# Patient Record
(demographics unavailable — no encounter records)

---

## 2025-05-23 NOTE — HISTORY OF PRESENT ILLNESS
[FreeTextEntry1] : Ms. Wood is a 78 year old female who is referred to our clinic today by Dr. Horn for evaluation of her Aortic Stenosis. She has a past medical history of HTN, Type 2 Diabetes, Diastolic dysfunction and remote Cervical Cancer. She was never a smoker.  Today she presents  She had a Transthoracic Echocardiogram which was evaluated with Dr. Nannette An, and demonstrates     NYHA Classification: STS 30-day Mortality Risk Score: 4.03%

## 2025-05-23 NOTE — REASON FOR VISIT
[Structural Heart and Valve Disease] : structural heart and valve disease [FreeTextEntry3] : Dr. Horn

## 2025-05-27 NOTE — REVIEW OF SYSTEMS
[Feeling Fatigued] : feeling fatigued [Dyspnea on exertion] : dyspnea during exertion [Chest Discomfort] : chest discomfort [Lower Ext Edema] : lower extremity edema [Negative] : Heme/Lymph [SOB] : no shortness of breath [Palpitations] : no palpitations [Syncope] : no syncope [Abdominal Pain] : no abdominal pain [Nausea] : no nausea [Vomiting] : no vomiting [Diarrhea] : diarrhea [Constipation] : no constipation [Blood in Stool] : no blood in stool

## 2025-05-27 NOTE — HISTORY OF PRESENT ILLNESS
[Hypertension] : Hypertension [Class III] : Class III [Prior Heart Failure] : Prior Heart Failure [FreeTextEntry1] : Ms. Wood is a 78 year old female who is referred to our clinic today by Dr. Horn for evaluation of her Aortic Stenosis. She has a past medical history of HTN, Type 2 Diabetes, Diastolic dysfunction and remote Cervical Cancer. She was never a smoker.  Today she presents with her family stating she feels OK. She has known about her Aortic Stenosis, and has been watching it and was doing well. She states that since around Thanksgiving she has been getting fatigued and NO with activity especially with stairs or after eating. She will occasionally get a chest heaviness which goes away after rest. She will occasionally get slight pedal edema depending on what she is eating. Overall, her activity level has decreased due to her symptoms. She is definitely eating less per her family. She wakes up often at night to urinate, but her breathing at night is good. She has had no cardiac related admissions in the last year and is independent in her ADL's.  She had a Transthoracic Echocardiogram which was evaluated with Dr. Nannette An, and demonstrates: The left ventricular cavity is normal in size.  Left ventricular systolic function is normal with a calculated ejection fraction of 66 % by the Stanford's biplane method of disks.  Left ventricular regional wall motion assessment reveals akinesis of the apical wall. All remaining wall segments are normal.Normal right ventricular cavity size and normal right ventricular systolic function.Normal left and right atrial size.The aortic valve appears trileaflet with reduced systolic excursion.  There is calcification of the aortic valve leaflets.  There is severe aortic stenosis.  There is low flow, low gradient aortic stenosis with reduced EF. Left ventricular stroke volume is 68.3 ml ;left ventricular stroke volume index is 35.50 ml/m.  The peak transaortic velocity is 3.88 m/s, peak transaortic gradient is 60.3 mmHg and mean transaortic gradient is 33.3 mmHg with an LVOT/aortic valve VTI ratio of 0.21. The aortic valve acceleration time is 131 msec. The aortic valve area is estimated at 0.72 cm by the continuity equation and indexed at 0.38 cm/m.  There is mild aortic regurgitation.There is no other significant valvular regurgitation or stenosis.No echocardiographic evidence of pulmonary hypertension.No prior echocardiogram is available for comparison.   Severe AS (gradients are not so low but SV low) and also apex is out.        NYHA Classification: III STS 30-day Mortality Risk Score: 4.03%

## 2025-05-27 NOTE — ASSESSMENT
[FreeTextEntry1] : 78F with severe symptomatic aortic stenosis.  Risks and benefits of TAVR explained to patient and sons who are amendable to proceeding.  Pt will need a cardiac cath and TAVR CTA.

## 2025-05-27 NOTE — DISCUSSION/SUMMARY
[FreeTextEntry1] : Ms. OLMEDO has symptomatic severe AS with recently progressive symptoms. She has angina (and an apical WMA), NO, fatigue, and a marked functional decline over the prior year. She will be scheduled for an angiogram ASAP and then her cardiac CT. We discussed the likely possibility that she has significant CAD warranting revascularization. She does not wish to schedule her TAVR until after a family commitment on 6/21/25--we will follow up with for a TAVR date after her testing is completed. I discussed the potential risks and benefits of the TAVR procedure with the patient and her family in detail including death, stroke, bleeding, infection, PVL, vascular complications, and the possible need for a permanent pacemaker. All questions were answered in detail, and they wish to proceed as outlined above.

## 2025-05-27 NOTE — CARDIOLOGY SUMMARY
[de-identified] : None today; prior 4/29/25: Sinus Rhythm @ 96, DWIGHT 190 bpm, QRS 94 msec, QTc 407 msec

## 2025-05-27 NOTE — PHYSICAL EXAM
[Well Developed] : well developed [No Acute Distress] : no acute distress [Normal S1, S2] : normal S1, S2 [No Rub] : no rub [Murmur] : murmur [Clear Lung Fields] : clear lung fields [No Respiratory Distress] : no respiratory distress  [Edema ___] : edema [unfilled] [Normal] : alert and oriented, normal memory [de-identified] : II/VI JOSELO LUSB II/VI JOSELO RUSB

## 2025-05-27 NOTE — PHYSICAL EXAM
[Well Developed] : well developed [No Acute Distress] : no acute distress [Normal S1, S2] : normal S1, S2 [No Rub] : no rub [Murmur] : murmur [Clear Lung Fields] : clear lung fields [No Respiratory Distress] : no respiratory distress  [Edema ___] : edema [unfilled] [Normal] : alert and oriented, normal memory [de-identified] : II/VI JOSELO LUSB II/VI JOSELO RUSB

## 2025-05-27 NOTE — REASON FOR VISIT
[Consultation] : a consultation visit [Structural Heart and Valve Disease] : structural heart and valve disease [Family Member] : family member [FreeTextEntry3] : Dr. Horn

## 2025-05-27 NOTE — HISTORY OF PRESENT ILLNESS
[FreeTextEntry1] : Ms. Wood is a 78-year-old female referred to our valve clinic by Dr. Horn for evaluation of her Aortic Stenosis. She has a past medical history of HTN, Type 2 Diabetes, diastolic dysfunction and remote Cervical Cancer. She was never a smoker.  Today she presents with her family stating she feels OK. She has known about her Aortic Stenosis and has been watching it and was doing well. She states that since around Thanksgiving she has been getting fatigued and NO with activity especially with stairs or after eating. She will occasionally get a chest heaviness which goes away after rest. She will occasionally get slight pedal edema depending on what she is eating. Overall, her activity level has decreased due to her symptoms. She is definitely eating less per her family. She wakes up often at night to urinate, but her breathing at night is good. She has had no cardiac related admissions in the last year and is independent in her ADL's.  She had a Transthoracic Echocardiogram which was evaluated with Dr. Nannette An and demonstrates: The left ventricular cavity is normal in size.  Left ventricular systolic function is normal with a calculated ejection fraction of 66 % by the Stanford's biplane method of disks.  Left ventricular regional wall motion assessment reveals akinesis of the apical wall. All remaining wall segments are normal. Normal right ventricular cavity size and normal right ventricular systolic function. Normal left and right atrial size. The aortic valve appears trileaflet with reduced systolic excursion.  There is calcification of the aortic valve leaflets.  There is severe aortic stenosis.  There is low flow, low gradient aortic stenosis with reduced EF. Left ventricular stroke volume is 68.3 ml; left ventricular stroke volume index is 35.50 ml/m.  The peak transaortic velocity is 3.88 m/s, peak transaortic gradient is 60.3 mmHg and mean transaortic gradient is 33.3 mmHg with an LVOT/aortic valve VTI ratio of 0.21. The aortic valve acceleration time is 131 msec. The aortic valve area is estimated at 0.72 cm2 by the continuity equation and indexed at 0.38 cm/m.  There is mild aortic regurgitation. There is no other significant valvular regurgitation or stenosis. No echocardiographic evidence of pulmonary hypertension. No prior echocardiogram is available for comparison. Severe AS with low SV and an apical WMA (for which she will also undergo coronary angiography).    She ntoes that Dr Horn has been following her AS for a few years. About a year ago she began to notice "heaviness in my chest" with exertion, at least since last fall. She has increasing fatigue with afternoon napping which "I never did that before" a year ago. She becomes winded with a FOS or "moving around fast" that she felt was due to anxiety and notes it is "overwhelming for me" doing her usual activites, such as grocery shopping. She became SOB after "eating a heavy meal" last Thanksgiving while visiting her son in [place]. She notes "I am very slow in everything I do" which is new for her. She has no dizziness or syncope. She reports no known history of CAD, MI, CVA, stents, or prior cardiac surgery. Her cervical cancer was treated in 2021 with chemotherapy and radiation. She describes her memory as "very good" and her appetite is "too good" and BR habits notable for some occasional constipation (for which she uses Dulcolax as needed). There have been no recent medication chagnes. She ntoes "I have always had slight" peripheral edema.   NYHA Classification: III STS 30-day Mortality Risk Score: 4.03%

## 2025-07-23 NOTE — ASSESSMENT
[FreeTextEntry1] : S/p TAVR- continue Asa, Avapro, Clopidogrel, Metoprolol Succinate, and Rosuvastatin.  DM- check blood glucose daily, record reading, and continue consistent carb diet. Continue Lantus, and Victoza.

## 2025-07-23 NOTE — REASON FOR VISIT
[Telehealth (audio & video)] : This visit was provided via telehealth using real-time 2-way audio visual technology. [Follow-Up: _____] : a [unfilled] follow-up visit [Family Member] : family member [Home] : at home, [unfilled] , at the time of the visit. [Other Location: e.g. Home (Enter Location, City,State)___] : at [unfilled] [Verbal consent obtained from patient] : the patient, [unfilled] [FreeTextEntry2] : 7/17/25

## 2025-07-23 NOTE — PLAN
[TextEntry] : Post Operative Care:  Pt advised of importance of daily weights. Pt advised to call [facility] NP for weight gain of 3 lbs or more.  Pt instructed on how to use incentive spirometer every hour, demonstrated proper use.  Pt encouraged to ambulate as much as tolerated, avoiding extreme temperatures outdoors.  Also advised to cleanse incisions daily with mild soap and water and to avoid lotions, powders, ointments or creams near or on the incision.  Low salt, low fat diet encouraged and discussed.  Pt advised to avoid heavy lifting or straining.     Follow [facility] team will continue to follow up with pt status.  NP/CCC roles explained with pt understanding, contact information provided. Pt agrees to call with any questions, issues or concerns.  Worsening symptoms reviewed with patient understanding.      FOLLOW UP APPOINTMENTS:  CTS: Dr. Hays appt 7/29/25  CARDIOLOGIST: Dr. Marley appt 1 month  EPS- Dr. Vanessa appt 7/31/25  PCP: Pt encouraged to follow up within one month of discharge

## 2025-07-23 NOTE — HISTORY OF PRESENT ILLNESS
[Diabetes Mellitus] : Diabetes Mellitus [Diet] : controlled with diet [Insulin] : controlled with insulin [Hypertension] : Hypertension [FreeTextEntry1] : 78 years old female with PMHx of HTN, DMT2, diastolic dysfunction and remote cervical cancer, severe AS with recent progressive symptoms, NO, fatigue 7/17 S/P TAVR intraop LBBB> stable post op , pacing wire removed Tele -120> EKG done , new first degree HB, RBBB>Dr Hays requests EP consult 7/18 NPO after midnight for possible PPM. No AV luther blockers. < from: 12 Lead ECG (06.19.25 @ 12:31) > P-R Interval 184 ms->182->208 QRS Duration 88 ms->150->154 Q-T Interval 358 ms->424->360 7/19 Plan for PPM Monday. Prolonged IA. Off AV luther blockers 7/20 NPO after midnight, t&C. 7/21 s/p Medtronic dual chamber PPM - check PA LAT in am to confirm lead placement 7/22 VSS, CXR done with good lead placement, pt is cleared for discharge home today per Dr. Hays.  Ms. Wood is recovering at home. She is ambulating independently.  Pt verbalized she slept well and awoke feeling "groggy". Pt verbalized feeling better after taking her medications.  Pt c/o pain to upper left chest at PPM site.  Pt has a good appetite. Denies SOB and palpitations.  Pt c/o swelling to .  [facility] to initiate care.

## 2025-07-23 NOTE — PHYSICAL EXAM
[Sclera] : the sclera and conjunctiva were normal [PERRL With Normal Accommodation] : pupils were equal in size, round, and reactive to light [Neck Appearance] : the appearance of the neck was normal [Respiration, Rhythm And Depth] : normal respiratory rhythm and effort [Exaggerated Use Of Accessory Muscles For Inspiration] : no accessory muscle use [Examination Of The Chest] : the chest was normal in appearance [Chest Visual Inspection Thoracic Asymmetry] : no chest asymmetry [Diminished Respiratory Excursion] : normal chest expansion [Nail Clubbing] : no clubbing  or cyanosis of the fingernails [Involuntary Movements] : no involuntary movements were seen [Skin Color & Pigmentation] : normal skin color and pigmentation [Skin Turgor] : normal skin turgor [] : no rash [No Focal Deficits] : no focal deficits [Oriented To Time, Place, And Person] : oriented to person, place, and time [Impaired Insight] : insight and judgment were intact [Affect] : the affect was normal [Mood] : the mood was normal [Memory Recent] : recent memory was not impaired [Memory Remote] : remote memory was not impaired [FreeTextEntry1] : B/L LE edema +1-2

## 2025-07-29 NOTE — REASON FOR VISIT
[Family Member] : family member [de-identified] : TAVR [de-identified] : 7/17/25 [de-identified] : 78 years old female with PMHx of HTN, DMT2, diastolic dysfunction and remote cervical cancer, severe AS with recent progressive symptoms, NO, fatigue  s/p TAVR/ R fem artery balloon 7/17 with intraop LBBB which progressed requiring a PPM 7/21.    Today she presents and reports she has pedal edema and tired. She has pain to her LT upper chest wall pacemaker area with swelling. Denies any shortness of breath, palpitations, dizziness.

## 2025-07-29 NOTE — PHYSICAL EXAM
[] : no respiratory distress [Respiration, Rhythm And Depth] : normal respiratory rhythm and effort [Auscultation Breath Sounds / Voice Sounds] : lungs were clear to auscultation bilaterally [Apical Impulse] : the apical impulse was normal [Heart Sounds] : normal S1 and S2 [Heart Rate And Rhythm] : heart rate was normal and rhythm regular [Murmurs] : no murmurs [Healing Well] : healing well [Clean] : clean [Dry] : dry [FreeTextEntry3] : B/L Groin [FreeTextEntry1] : +Swelling to LT upper chest wall pacemaker site

## 2025-07-29 NOTE — REASON FOR VISIT
[Family Member] : family member [de-identified] : TAVR [de-identified] : 7/17/25 [de-identified] : 78 years old female with PMHx of HTN, DMT2, diastolic dysfunction and remote cervical cancer, severe AS with recent progressive symptoms, NO, fatigue  s/p TAVR/ R fem artery balloon 7/17 with intraop LBBB which progressed requiring a PPM 7/21.    Today she presents and reports she has pedal edema and tired. She has pain to her LT upper chest wall pacemaker area with swelling. Denies any shortness of breath, palpitations, dizziness.

## 2025-07-29 NOTE — PHYSICAL EXAM
[] : no respiratory distress [Respiration, Rhythm And Depth] : normal respiratory rhythm and effort [Auscultation Breath Sounds / Voice Sounds] : lungs were clear to auscultation bilaterally [Apical Impulse] : the apical impulse was normal [Heart Rate And Rhythm] : heart rate was normal and rhythm regular [Heart Sounds] : normal S1 and S2 [Murmurs] : no murmurs [Healing Well] : healing well [Clean] : clean [Dry] : dry [FreeTextEntry3] : B/L Groin [FreeTextEntry1] : +Swelling to LT upper chest wall pacemaker site

## 2025-07-29 NOTE — ASSESSMENT
[FreeTextEntry1] : 78 years old female s/p TAVR and post procedure PPM c/o swelling and pain at PPM site.  Today on exam, patient's lungs are clear bilaterally, normal sinus rhythm and bilateral groins are clean, dry and intact. There is no hematoma. No peripheral edema noted on exam. EKG performed and reviewed. SBE antibiotic prophylaxis discussed at length.  Patient instructed to continue current medication regimen and follow up with cardiology.   Plan: 1.  Pt to f/u with EPS in 2 days (will see if we can move appointment up to today) 2. Continue current medication regimen 3. TTE in 1 month with cardiologist  4. Follow up with Cardiologist and PCP 5. SBE Prophylaxis discuss in length 6. May return to clinic on PRN basis